# Patient Record
Sex: FEMALE | Race: ASIAN | NOT HISPANIC OR LATINO | URBAN - METROPOLITAN AREA
[De-identification: names, ages, dates, MRNs, and addresses within clinical notes are randomized per-mention and may not be internally consistent; named-entity substitution may affect disease eponyms.]

---

## 2017-01-02 ENCOUNTER — INPATIENT (INPATIENT)
Facility: HOSPITAL | Age: 72
LOS: 2 days | Discharge: HOME | DRG: 641 | End: 2017-01-05
Attending: INTERNAL MEDICINE | Admitting: INTERNAL MEDICINE
Payer: MEDICARE

## 2017-01-02 VITALS
HEIGHT: 60 IN | RESPIRATION RATE: 18 BRPM | WEIGHT: 169.98 LBS | DIASTOLIC BLOOD PRESSURE: 85 MMHG | SYSTOLIC BLOOD PRESSURE: 173 MMHG | TEMPERATURE: 98 F | OXYGEN SATURATION: 100 % | HEART RATE: 90 BPM

## 2017-01-02 DIAGNOSIS — E87.1 HYPO-OSMOLALITY AND HYPONATREMIA: ICD-10-CM

## 2017-01-02 DIAGNOSIS — E11.49 TYPE 2 DIABETES MELLITUS WITH OTHER DIABETIC NEUROLOGICAL COMPLICATION: ICD-10-CM

## 2017-01-02 DIAGNOSIS — G45.9 TRANSIENT CEREBRAL ISCHEMIC ATTACK, UNSPECIFIED: ICD-10-CM

## 2017-01-02 DIAGNOSIS — I10 ESSENTIAL (PRIMARY) HYPERTENSION: ICD-10-CM

## 2017-01-02 DIAGNOSIS — Z90.89 ACQUIRED ABSENCE OF OTHER ORGANS: Chronic | ICD-10-CM

## 2017-01-02 DIAGNOSIS — E78.4 OTHER HYPERLIPIDEMIA: ICD-10-CM

## 2017-01-02 DIAGNOSIS — L98.499 NON-PRESSURE CHRONIC ULCER OF SKIN OF OTHER SITES WITH UNSPECIFIED SEVERITY: ICD-10-CM

## 2017-01-02 DIAGNOSIS — Z90.710 ACQUIRED ABSENCE OF BOTH CERVIX AND UTERUS: Chronic | ICD-10-CM

## 2017-01-02 LAB
ALBUMIN SERPL ELPH-MCNC: 4.4 G/DL — SIGNIFICANT CHANGE UP (ref 3.3–5.2)
ALBUMIN SERPL ELPH-MCNC: 4.4 G/DL — SIGNIFICANT CHANGE UP (ref 3.3–5.2)
ALP SERPL-CCNC: 70 U/L — SIGNIFICANT CHANGE UP (ref 40–120)
ALP SERPL-CCNC: 75 U/L — SIGNIFICANT CHANGE UP (ref 40–120)
ALT FLD-CCNC: 10 U/L — SIGNIFICANT CHANGE UP
ALT FLD-CCNC: 11 U/L — SIGNIFICANT CHANGE UP
ANION GAP SERPL CALC-SCNC: 14 MMOL/L — SIGNIFICANT CHANGE UP (ref 5–17)
ANION GAP SERPL CALC-SCNC: 15 MMOL/L — SIGNIFICANT CHANGE UP (ref 5–17)
APPEARANCE UR: CLEAR — SIGNIFICANT CHANGE UP
AST SERPL-CCNC: 14 U/L — SIGNIFICANT CHANGE UP
AST SERPL-CCNC: 15 U/L — SIGNIFICANT CHANGE UP
BASOPHILS # BLD AUTO: 0 K/UL — SIGNIFICANT CHANGE UP (ref 0–0.2)
BASOPHILS NFR BLD AUTO: 0.8 % — SIGNIFICANT CHANGE UP (ref 0–2)
BILIRUB SERPL-MCNC: 0.3 MG/DL — LOW (ref 0.4–2)
BILIRUB SERPL-MCNC: 0.3 MG/DL — LOW (ref 0.4–2)
BILIRUB UR-MCNC: NEGATIVE — SIGNIFICANT CHANGE UP
BUN SERPL-MCNC: 10 MG/DL — SIGNIFICANT CHANGE UP (ref 8–20)
BUN SERPL-MCNC: 9 MG/DL — SIGNIFICANT CHANGE UP (ref 8–20)
CALCIUM SERPL-MCNC: 9.2 MG/DL — SIGNIFICANT CHANGE UP (ref 8.6–10.2)
CALCIUM SERPL-MCNC: 9.5 MG/DL — SIGNIFICANT CHANGE UP (ref 8.6–10.2)
CHLORIDE SERPL-SCNC: 79 MMOL/L — LOW (ref 98–107)
CHLORIDE SERPL-SCNC: 84 MMOL/L — LOW (ref 98–107)
CO2 SERPL-SCNC: 21 MMOL/L — LOW (ref 22–29)
CO2 SERPL-SCNC: 22 MMOL/L — SIGNIFICANT CHANGE UP (ref 22–29)
COLOR SPEC: SIGNIFICANT CHANGE UP
CREAT SERPL-MCNC: 0.56 MG/DL — SIGNIFICANT CHANGE UP (ref 0.5–1.3)
CREAT SERPL-MCNC: 0.67 MG/DL — SIGNIFICANT CHANGE UP (ref 0.5–1.3)
DIFF PNL FLD: NEGATIVE — SIGNIFICANT CHANGE UP
EOSINOPHIL # BLD AUTO: 0.1 K/UL — SIGNIFICANT CHANGE UP (ref 0–0.5)
EOSINOPHIL NFR BLD AUTO: 1.2 % — SIGNIFICANT CHANGE UP (ref 0–6)
GLUCOSE SERPL-MCNC: 104 MG/DL — SIGNIFICANT CHANGE UP (ref 70–115)
GLUCOSE SERPL-MCNC: 119 MG/DL — HIGH (ref 70–115)
GLUCOSE UR QL: NEGATIVE MG/DL — SIGNIFICANT CHANGE UP
HCT VFR BLD CALC: 32.2 % — LOW (ref 37–47)
HGB BLD-MCNC: 11.7 G/DL — LOW (ref 12–16)
KETONES UR-MCNC: ABNORMAL
LEUKOCYTE ESTERASE UR-ACNC: NEGATIVE — SIGNIFICANT CHANGE UP
LYMPHOCYTES # BLD AUTO: 1.5 K/UL — SIGNIFICANT CHANGE UP (ref 1–4.8)
LYMPHOCYTES # BLD AUTO: 30.3 % — SIGNIFICANT CHANGE UP (ref 20–55)
MCHC RBC-ENTMCNC: 31.2 PG — HIGH (ref 27–31)
MCHC RBC-ENTMCNC: 36.3 G/DL — HIGH (ref 32–36)
MCV RBC AUTO: 85.9 FL — SIGNIFICANT CHANGE UP (ref 81–99)
MONOCYTES # BLD AUTO: 0.4 K/UL — SIGNIFICANT CHANGE UP (ref 0–0.8)
MONOCYTES NFR BLD AUTO: 9.3 % — SIGNIFICANT CHANGE UP (ref 3–10)
NEUTROPHILS # BLD AUTO: 2.8 K/UL — SIGNIFICANT CHANGE UP (ref 1.8–8)
NEUTROPHILS NFR BLD AUTO: 58.4 % — SIGNIFICANT CHANGE UP (ref 37–73)
NITRITE UR-MCNC: NEGATIVE — SIGNIFICANT CHANGE UP
PH UR: 6 — SIGNIFICANT CHANGE UP (ref 4.8–8)
PLATELET # BLD AUTO: 332 K/UL — SIGNIFICANT CHANGE UP (ref 150–400)
POTASSIUM SERPL-MCNC: 4 MMOL/L — SIGNIFICANT CHANGE UP (ref 3.5–5.3)
POTASSIUM SERPL-MCNC: 4.3 MMOL/L — SIGNIFICANT CHANGE UP (ref 3.5–5.3)
POTASSIUM SERPL-SCNC: 4 MMOL/L — SIGNIFICANT CHANGE UP (ref 3.5–5.3)
POTASSIUM SERPL-SCNC: 4.3 MMOL/L — SIGNIFICANT CHANGE UP (ref 3.5–5.3)
PROT SERPL-MCNC: 7.7 G/DL — SIGNIFICANT CHANGE UP (ref 6.6–8.7)
PROT SERPL-MCNC: 7.7 G/DL — SIGNIFICANT CHANGE UP (ref 6.6–8.7)
PROT UR-MCNC: 30 MG/DL
RBC # BLD: 3.75 M/UL — LOW (ref 4.4–5.2)
RBC # FLD: 11.8 % — SIGNIFICANT CHANGE UP (ref 11–15.6)
SODIUM SERPL-SCNC: 116 MMOL/L — CRITICAL LOW (ref 135–145)
SODIUM SERPL-SCNC: 119 MMOL/L — CRITICAL LOW (ref 135–145)
SP GR SPEC: 1.01 — SIGNIFICANT CHANGE UP (ref 1.01–1.02)
TSH SERPL-MCNC: 2.42 UIU/ML — SIGNIFICANT CHANGE UP (ref 0.49–4.67)
UROBILINOGEN FLD QL: NEGATIVE MG/DL — SIGNIFICANT CHANGE UP
WBC # BLD: 4.82 K/UL — SIGNIFICANT CHANGE UP (ref 4.8–10.8)
WBC # FLD AUTO: 4.82 K/UL — SIGNIFICANT CHANGE UP (ref 4.8–10.8)

## 2017-01-02 PROCEDURE — 93010 ELECTROCARDIOGRAM REPORT: CPT

## 2017-01-02 PROCEDURE — 99223 1ST HOSP IP/OBS HIGH 75: CPT | Mod: AI

## 2017-01-02 PROCEDURE — 70450 CT HEAD/BRAIN W/O DYE: CPT | Mod: 26

## 2017-01-02 PROCEDURE — 99285 EMERGENCY DEPT VISIT HI MDM: CPT

## 2017-01-02 RX ORDER — ENOXAPARIN SODIUM 100 MG/ML
40 INJECTION SUBCUTANEOUS ONCE
Qty: 0 | Refills: 0 | Status: COMPLETED | OUTPATIENT
Start: 2017-01-02 | End: 2017-01-03

## 2017-01-02 RX ORDER — SODIUM CHLORIDE 9 MG/ML
1000 INJECTION INTRAMUSCULAR; INTRAVENOUS; SUBCUTANEOUS
Qty: 0 | Refills: 0 | Status: DISCONTINUED | OUTPATIENT
Start: 2017-01-02 | End: 2017-01-03

## 2017-01-02 RX ORDER — ACETAMINOPHEN 500 MG
650 TABLET ORAL EVERY 6 HOURS
Qty: 0 | Refills: 0 | Status: DISCONTINUED | OUTPATIENT
Start: 2017-01-02 | End: 2017-01-05

## 2017-01-02 RX ORDER — PANTOPRAZOLE SODIUM 20 MG/1
0 TABLET, DELAYED RELEASE ORAL
Qty: 0 | Refills: 0 | COMMUNITY

## 2017-01-02 RX ORDER — PANTOPRAZOLE SODIUM 20 MG/1
40 TABLET, DELAYED RELEASE ORAL
Qty: 0 | Refills: 0 | Status: DISCONTINUED | OUTPATIENT
Start: 2017-01-02 | End: 2017-01-05

## 2017-01-02 RX ORDER — ASCORBIC ACID 60 MG
0 TABLET,CHEWABLE ORAL
Qty: 0 | Refills: 0 | COMMUNITY

## 2017-01-02 RX ORDER — HYDRALAZINE HCL 50 MG
15 TABLET ORAL EVERY 4 HOURS
Qty: 0 | Refills: 0 | Status: DISCONTINUED | OUTPATIENT
Start: 2017-01-02 | End: 2017-01-05

## 2017-01-02 RX ORDER — ASPIRIN/CALCIUM CARB/MAGNESIUM 324 MG
81 TABLET ORAL DAILY
Qty: 0 | Refills: 0 | Status: DISCONTINUED | OUTPATIENT
Start: 2017-01-02 | End: 2017-01-05

## 2017-01-02 RX ORDER — OMEGA-3 ACID ETHYL ESTERS 1 G
0 CAPSULE ORAL
Qty: 0 | Refills: 0 | COMMUNITY

## 2017-01-02 RX ORDER — DEXTROSE 50 % IN WATER 50 %
1 SYRINGE (ML) INTRAVENOUS ONCE
Qty: 0 | Refills: 0 | Status: DISCONTINUED | OUTPATIENT
Start: 2017-01-02 | End: 2017-01-05

## 2017-01-02 RX ORDER — SODIUM CHLORIDE 9 MG/ML
1000 INJECTION, SOLUTION INTRAVENOUS
Qty: 0 | Refills: 0 | Status: DISCONTINUED | OUTPATIENT
Start: 2017-01-02 | End: 2017-01-05

## 2017-01-02 RX ORDER — FUROSEMIDE 40 MG
40 TABLET ORAL ONCE
Qty: 0 | Refills: 0 | Status: COMPLETED | OUTPATIENT
Start: 2017-01-02 | End: 2017-01-03

## 2017-01-02 RX ORDER — FERROUS SULFATE 325(65) MG
0 TABLET ORAL
Qty: 0 | Refills: 0 | COMMUNITY

## 2017-01-02 RX ORDER — GLUCAGON INJECTION, SOLUTION 0.5 MG/.1ML
1 INJECTION, SOLUTION SUBCUTANEOUS ONCE
Qty: 0 | Refills: 0 | Status: DISCONTINUED | OUTPATIENT
Start: 2017-01-02 | End: 2017-01-05

## 2017-01-02 RX ORDER — INSULIN LISPRO 100/ML
VIAL (ML) SUBCUTANEOUS
Qty: 0 | Refills: 0 | Status: DISCONTINUED | OUTPATIENT
Start: 2017-01-02 | End: 2017-01-05

## 2017-01-02 RX ORDER — SODIUM CHLORIDE 9 MG/ML
1000 INJECTION INTRAMUSCULAR; INTRAVENOUS; SUBCUTANEOUS ONCE
Qty: 0 | Refills: 0 | Status: COMPLETED | OUTPATIENT
Start: 2017-01-02 | End: 2017-01-02

## 2017-01-02 RX ORDER — DEXTROSE 50 % IN WATER 50 %
12.5 SYRINGE (ML) INTRAVENOUS ONCE
Qty: 0 | Refills: 0 | Status: DISCONTINUED | OUTPATIENT
Start: 2017-01-02 | End: 2017-01-05

## 2017-01-02 RX ORDER — DEXTROSE 50 % IN WATER 50 %
25 SYRINGE (ML) INTRAVENOUS ONCE
Qty: 0 | Refills: 0 | Status: DISCONTINUED | OUTPATIENT
Start: 2017-01-02 | End: 2017-01-05

## 2017-01-02 RX ORDER — NIFEDIPINE 30 MG
30 TABLET, EXTENDED RELEASE 24 HR ORAL DAILY
Qty: 0 | Refills: 0 | Status: DISCONTINUED | OUTPATIENT
Start: 2017-01-02 | End: 2017-01-05

## 2017-01-02 RX ORDER — INSULIN LISPRO 100/ML
VIAL (ML) SUBCUTANEOUS AT BEDTIME
Qty: 0 | Refills: 0 | Status: DISCONTINUED | OUTPATIENT
Start: 2017-01-02 | End: 2017-01-05

## 2017-01-02 RX ADMIN — SODIUM CHLORIDE 1000 MILLILITER(S): 9 INJECTION INTRAMUSCULAR; INTRAVENOUS; SUBCUTANEOUS at 17:05

## 2017-01-02 RX ADMIN — SODIUM CHLORIDE 1000 MILLILITER(S): 9 INJECTION INTRAMUSCULAR; INTRAVENOUS; SUBCUTANEOUS at 19:00

## 2017-01-02 RX ADMIN — SODIUM CHLORIDE 50 MILLILITER(S): 9 INJECTION INTRAMUSCULAR; INTRAVENOUS; SUBCUTANEOUS at 21:52

## 2017-01-02 NOTE — CONSULT NOTE ADULT - SUBJECTIVE AND OBJECTIVE BOX
72 y/o female 72 y/o female who while at the mall tonight became progressively week feeling like near syncope was brought to the ER found to be hyponatremic with initial sodium of 116. Consult called for hyponatremia      Vitals: Bp 168/72 Hr 84 RR 16 O2 sat 98%  Labs : Na 116 K 4.3 BUN 9.0 Cr 0.56 Gluc 104             Na 119 K 4.0 BUN 10 Cr 0.67 Cluc 119     ROS   Neuro:  negative, Denies HA, N/V, visual disturbances  EENT: Negative  RESP:  negative Denies Resp difficulty  Cardiac: Negative denies CP   Abd: Negative   : Negative      · Constitutional Comments	patient resting comfortly, good mood joking about waking her up with good affect	  · Eyes	EOMI; PERRL; no drainage or redness	  · ENMT	No oral lesions; no gross abnormalities	  · Neck	No bruits; no thyromegaly or nodules no JVD	  · Breasts	not examined	  		  · Respiratory	Breath Sounds equal & clear to percussion & auscultation, no accessory muscle use	  · Cardiovascular	Regular rate & rhythm, normal S1, S2; no murmurs, gallops or rubs; no S3, S4	  · Gastrointestinal  Neuro:  A&Ox3 MAEx4 good strenght, no    seizure  neurologically intact without deficits                                    	Soft, non-tender, no hepatosplenomegaly, normal bowel sounds

## 2017-01-02 NOTE — ED PROVIDER NOTE - OBJECTIVE STATEMENT
pt has been more confused past two days   today becamer acutely weak about 12- 1 opmm  unbale to walk while at mall pt has been more confused past two days   today became acutely weak about 12- 1 opmm  unable to walk while at mall

## 2017-01-02 NOTE — ED ADULT NURSE REASSESSMENT NOTE - NS ED NURSE REASSESS COMMENT FT1
Pt awake in bed, incontinent of urine x1. Perianal care performed, pt changed for comfort. Straight cath performed, approx 500cc urine drained, clear, straw yellow, no odor. UA sent to lab. Pt tolerated procedure well. NSR on cardiac monitor. VSS. Family @ bedside. Updated on POC, will continue to monitor and reassess.

## 2017-01-02 NOTE — H&P ADULT. - HISTORY OF PRESENT ILLNESS
71 year old female htn, hyperlipidemia, DM, history of electrolyte disturbance [place] ago. She was feeling poorly but decided to go to the mall today. In the mall she felt woozy and fell but her son caught her and brought her to the hospital where her sodium was found to be 116.  She was given normal saline now feels slightly better.

## 2017-01-02 NOTE — ED ADULT TRIAGE NOTE - CHIEF COMPLAINT QUOTE
patient arrived via wheelchair, awake, alert, confused, breathing unlabored.  As per son patient has been increasingly confused over past 2 days.  Son states patient became acutely weak today with unsteady gait, and increased confusion.  Dr. Cornell at bedside on arrival.  Blood sugar 141

## 2017-01-02 NOTE — ED ADULT NURSE NOTE - OBJECTIVE STATEMENT
Pt BIB family member, c/o AMS increased confusion over the past 2 days and increasing generalized weakness. Pt and family member are poor historians. Family member states pt baseline is unsteady balance and gait d/t weakness, but today it was worse than normal with pt "unable to hold herself up" @ the mall w/ son. Pt a&ox2, disoriented to time and situation, which family member states is pts baseline. Denies LOC, denies hitting head.  #20G IV noted to Right hand, dressing c/d/i. Resp even, unlabored. Moves all extremities. Pt in no apparent distress @ this time. NSR on cardiac monitor. Family @ bedside, updated on POC. Transport in ED to take pt to CT scan, will continue to monitor and reassess.

## 2017-01-02 NOTE — CONSULT NOTE ADULT - PROBLEM SELECTOR RECOMMENDATION 9
Normal volume hyponatremia  monitor for seizure activity   Normal saline at slow rate 50-75 cc/hr   renal consult

## 2017-01-02 NOTE — CONSULT NOTE ADULT - SUBJECTIVE AND OBJECTIVE BOX
Patient is a 71y old  Female who presents with a chief complaint of Altered mental status (2017 18:56)  We are asked to see pt due to low Na.    HPI:  71 year old female htn, hyperlipidemia, DM, history of electrolyte disturbance [place] ago. She was feeling poorly but decided to go to the mall today. In the mall she felt woozy and fell but her son caught her and brought her to the hospital where her sodium was found to be 116.  She was given normal saline now feels slightly better. (2017 18:56). HX of similar episode in [place] and was told to  drink  electrolyte  solution daily but has not. Hx prior TIA as per son.      PAST MEDICAL & SURGICAL HISTORY:  Hemorrhoids  Ulcer  TIA (transient ischemic attack)  Other hyperlipidemia  Essential hypertension  DM (diabetes mellitus)  S/P tonsillectomy    FAMILY HISTORY:  No pertinent family history  [place]    Social History: Prior smoker, [place]    MEDICATIONS  (STANDING):  aspirin enteric coated 81milliGRAM(s) Oral daily  insulin lispro (HumaLOG) corrective regimen sliding scale  SubCutaneous three times a day before meals  insulin lispro (HumaLOG) corrective regimen sliding scale  SubCutaneous at bedtime  dextrose 5%. 1000milliLiter(s) IV Continuous <Continuous>  dextrose 50% Injectable 12.5Gram(s) IV Push once  dextrose 50% Injectable 25Gram(s) IV Push once  dextrose 50% Injectable 25Gram(s) IV Push once  pantoprazole    Tablet 40milliGRAM(s) Oral before breakfast  enoxaparin Injectable 40milliGRAM(s) SubCutaneous once  sodium chloride 0.9%. 1000milliLiter(s) IV Continuous <Continuous>  furosemide   Injectable 40milliGRAM(s) IV Push once  NIFEdipine XL 30milliGRAM(s) Oral daily    MEDICATIONS  (PRN):  acetaminophen   Tablet 650milliGRAM(s) Oral every 6 hours PRN mild pain  dextrose Gel 1Dose(s) Oral once PRN Blood Glucose LESS THAN 70 milliGRAM(s)/deciliter  glucagon  Injectable 1milliGRAM(s) IntraMuscular once PRN Glucose LESS THAN 70 milligrams/deciliter  hydrALAZINE Injectable 15milliGRAM(s) IV Push every 4 hours PRN for systolic bp > 160   Meds reviewed    Allergies    No Known Allergies    Intolerances       REVIEW OF SYSTEMS:    CONSTITUTIONAL:  feels weak  EYES: No eye pain, visual disturbances, or discharge  ENMT:  No difficulty hearing, tinnitus, vertigo; No sinus or throat pain  NECK: No pain or stiffness  BREASTS: No pain, masses, or nipple discharge  RESPIRATORY: not sob  CARDIOVASCULAR: No chest pain, palpitations,  GASTROINTESTINAL: No abdominal or epigastric pain. No nausea, vomiting, or hematemesis; No diarrhea or constipation.   GENITOURINARY: No dysuria, frequency, hematuria, or incontinence  NEUROLOGICAL: No headaches, memory loss, loss of strength, numbness, or tremors, prior TIA  SKIN: neg  LYMPH NODES: No enlarged glands  ENDOCRINE: No heat or cold intolerance; No hair loss  MUSCULOSKELETAL:  neg  PSYCHIATRIC: No depression, anxiety, mood swings, or difficulty sleeping  HEME/LYMPH: No easy bruising, or bleeding gums  ALLERY AND IMMUNOLOGIC: No hives or eczema      Vital Signs Last 24 Hrs  T(C): 36.6, Max: 36.6 ( @ 14:12)  T(F): 97.9, Max: 97.9 ( @ 14:12)  HR: 86 (86 - 90)  BP: 172/78 (172/78 - 188/92)  BP(mean): --  RR: 16 (16 - 24)  SpO2: 100% (100% - 100%)  Daily Height in cm: 152.4 (2017 14:12)    Daily     PHYSICAL EXAM:    GENERAL: appears chronically ill, oriented.  HEAD:  Atraumatic, Normocephalic  EYES: EOMI, PERRLA, conjunctiva and sclera clear  ENMT: No tonsillar erythema, exudates, or enlargement; Moist mucous membranes, Good dentition, No lesions  NECK: Supple, neck  veinsnot distended  NERVOUS SYSTEM:  Alert & Oriented X3, Good concentration; Motor Strength wnl upper and lower extremities;  CHEST/LUNG: Clear to percussion bilaterally; No rales, rhonchi, wheezing, or rubs  HEART: Regular rate and rhythm; No murmurs, rubs, or gallops  ABDOMEN: Soft, Nontender, Nondistended; Bowel sounds present,no edema  EXTREMITIES:no edema  LYMPH: No lymphadenopathy noted  SKIN: No rashes or lesions, pale      LABS:                        11.7   4.82  )-----------( 332      ( 2017 14:53 )             32.2     2017 20:30    119    |  84     |  9.0    ----------------------------<  104    4.0     |  21.0   |  0.56     Ca    9.2        2017 20:30    TPro  7.7    /  Alb  4.4    /  TBili  0.3    /  DBili  x      /  AST  14     /  ALT  11     /  AlkPhos  75     2017 20:30      Urinalysis Basic - ( 2017 17:09 )    Color: Pale Yellow / Appearance: Clear / S.015 / pH: x  Gluc: x / Ketone: Trace  / Bili: Negative / Urobili: Negative mg/dL   Blood: x / Protein: 30 mg/dL / Nitrite: Negative   Leuk Esterase: Negative / RBC: x / WBC x   Sq Epi: x / Non Sq Epi: Occasional / Bacteria: x              RADIOLOGY & ADDITIONAL TESTS:

## 2017-01-02 NOTE — CONSULT NOTE ADULT - ASSESSMENT
Symptomatic low  sodium; DM, Hypertension, ? SIADH  pt has been on lisinopril
70 y/o female evaluated for ICU admission at request of hospitalist for hyponatremia. Patient is fully alert and oriented to person place and time with now report of seizure activity with only complaint of general weakness no active vomiting previous nausea which has resolved  Patient at this time does not meet criteria for ICU admission due to hemodynamic stable,  no signs of neuro compromise from hyponatremia initial Na 116 and repeat Na is now 119.   Discussed with Dr Reilly ( hospitalist team) who agrees with current plan of admission to [facility] for monitoring, renal consult called per Dr Reilly and reconsult if condition changes

## 2017-01-02 NOTE — ED ADULT NURSE NOTE - PMH
DM (diabetes mellitus)    Essential hypertension    Hemorrhoids    Other hyperlipidemia    TIA (transient ischemic attack)    Ulcer

## 2017-01-03 LAB
ALBUMIN SERPL ELPH-MCNC: 4.2 G/DL — SIGNIFICANT CHANGE UP (ref 3.3–5.2)
ALP SERPL-CCNC: 64 U/L — SIGNIFICANT CHANGE UP (ref 40–120)
ALT FLD-CCNC: 10 U/L — SIGNIFICANT CHANGE UP
ANION GAP SERPL CALC-SCNC: 13 MMOL/L — SIGNIFICANT CHANGE UP (ref 5–17)
AST SERPL-CCNC: 12 U/L — SIGNIFICANT CHANGE UP
BILIRUB SERPL-MCNC: 0.3 MG/DL — LOW (ref 0.4–2)
BUN SERPL-MCNC: 7 MG/DL — LOW (ref 8–20)
CALCIUM SERPL-MCNC: 9.3 MG/DL — SIGNIFICANT CHANGE UP (ref 8.6–10.2)
CHLORIDE SERPL-SCNC: 90 MMOL/L — LOW (ref 98–107)
CO2 SERPL-SCNC: 25 MMOL/L — SIGNIFICANT CHANGE UP (ref 22–29)
CORTIS AM PEAK SERPL-MCNC: 11.8 UG/DL — SIGNIFICANT CHANGE UP (ref 6.2–19.4)
CORTIS F PM SERPL-MCNC: 5.7 UG/DL — SIGNIFICANT CHANGE UP (ref 2.3–11.9)
CREAT SERPL-MCNC: 0.67 MG/DL — SIGNIFICANT CHANGE UP (ref 0.5–1.3)
GLUCOSE SERPL-MCNC: 110 MG/DL — SIGNIFICANT CHANGE UP (ref 70–115)
HBA1C BLD-MCNC: 5.9 % — HIGH (ref 4–5.6)
HCT VFR BLD CALC: 32.5 % — LOW (ref 37–47)
HGB BLD-MCNC: 11.6 G/DL — LOW (ref 12–16)
MAGNESIUM SERPL-MCNC: 1.9 MG/DL — SIGNIFICANT CHANGE UP (ref 1.8–2.5)
MCHC RBC-ENTMCNC: 30.8 PG — SIGNIFICANT CHANGE UP (ref 27–31)
MCHC RBC-ENTMCNC: 35.7 G/DL — SIGNIFICANT CHANGE UP (ref 32–36)
MCV RBC AUTO: 86.2 FL — SIGNIFICANT CHANGE UP (ref 81–99)
OSMOLALITY SERPL: 268 MOS/KG — LOW (ref 275–300)
OSMOLALITY UR: 278 MOSM/KG — LOW (ref 300–1000)
PHOSPHATE SERPL-MCNC: 3.8 MG/DL — SIGNIFICANT CHANGE UP (ref 2.4–4.7)
PLATELET # BLD AUTO: 403 K/UL — HIGH (ref 150–400)
POTASSIUM SERPL-MCNC: 4.3 MMOL/L — SIGNIFICANT CHANGE UP (ref 3.5–5.3)
POTASSIUM SERPL-SCNC: 4.3 MMOL/L — SIGNIFICANT CHANGE UP (ref 3.5–5.3)
POTASSIUM UR-SCNC: 19 MMOL/L — SIGNIFICANT CHANGE UP
PROT SERPL-MCNC: 7.4 G/DL — SIGNIFICANT CHANGE UP (ref 6.6–8.7)
RBC # BLD: 3.77 M/UL — LOW (ref 4.4–5.2)
RBC # FLD: 11.3 % — SIGNIFICANT CHANGE UP (ref 11–15.6)
SODIUM SERPL-SCNC: 128 MMOL/L — LOW (ref 135–145)
WBC # BLD: 5.35 K/UL — SIGNIFICANT CHANGE UP (ref 4.8–10.8)
WBC # FLD AUTO: 5.35 K/UL — SIGNIFICANT CHANGE UP (ref 4.8–10.8)

## 2017-01-03 PROCEDURE — 99233 SBSQ HOSP IP/OBS HIGH 50: CPT

## 2017-01-03 RX ORDER — ENOXAPARIN SODIUM 100 MG/ML
40 INJECTION SUBCUTANEOUS ONCE
Qty: 0 | Refills: 0 | Status: COMPLETED | OUTPATIENT
Start: 2017-01-03 | End: 2017-01-03

## 2017-01-03 RX ADMIN — PANTOPRAZOLE SODIUM 40 MILLIGRAM(S): 20 TABLET, DELAYED RELEASE ORAL at 06:43

## 2017-01-03 RX ADMIN — Medication 40 MILLIGRAM(S): at 06:42

## 2017-01-03 RX ADMIN — ENOXAPARIN SODIUM 40 MILLIGRAM(S): 100 INJECTION SUBCUTANEOUS at 06:43

## 2017-01-03 RX ADMIN — Medication 81 MILLIGRAM(S): at 11:47

## 2017-01-03 RX ADMIN — Medication 30 MILLIGRAM(S): at 06:43

## 2017-01-03 NOTE — PROGRESS NOTE ADULT - SUBJECTIVE AND OBJECTIVE BOX
NEPHROLOGY INTERVAL HPI/OVERNIGHT EVENTS:    Examined earlier No complaints   Denies AH CP No SOB speaks very little English  mostly [place]. I stopped IVF earlier when I saw Na.    MEDICATIONS  (STANDING):  aspirin enteric coated 81milliGRAM(s) Oral daily  insulin lispro (HumaLOG) corrective regimen sliding scale  SubCutaneous three times a day before meals  insulin lispro (HumaLOG) corrective regimen sliding scale  SubCutaneous at bedtime  dextrose 5%. 1000milliLiter(s) IV Continuous <Continuous>  dextrose 50% Injectable 12.5Gram(s) IV Push once  dextrose 50% Injectable 25Gram(s) IV Push once  dextrose 50% Injectable 25Gram(s) IV Push once  pantoprazole    Tablet 40milliGRAM(s) Oral before breakfast  NIFEdipine XL 30milliGRAM(s) Oral daily    MEDICATIONS  (PRN):  acetaminophen   Tablet 650milliGRAM(s) Oral every 6 hours PRN mild pain  dextrose Gel 1Dose(s) Oral once PRN Blood Glucose LESS THAN 70 milliGRAM(s)/deciliter  glucagon  Injectable 1milliGRAM(s) IntraMuscular once PRN Glucose LESS THAN 70 milligrams/deciliter  hydrALAZINE Injectable 15milliGRAM(s) IV Push every 4 hours PRN for systolic bp > 160      Allergies    No Known Allergies    Intolerances        Vital Signs Last 24 Hrs  T(C): 36.6, Max: 36.6 ( @ 14:12)  T(F): 97.9, Max: 97.9 ( @ 14:12)  HR: 84 (84 - 90)  BP: 142/77 (142/77 - 188/92)  BP(mean): --  RR: 16 (16 - 24)  SpO2: 100% (100% - 100%)  Daily Height in cm: 152.4 (2017 14:12)    Daily     PHYSICAL EXAM:    GENERAL: NAD, well-groomed  HEAD:  Atraumatic, Normocephalic  EYES: EOMI  NECK: Supple  NERVOUS SYSTEM:  Alert & Oriented X3  CHEST/LUNG: Clear to percussion bilaterally  HEART: Regular rate and rhythm; No murmurs  ABDOMEN: Soft, Nontender, Nondistended  EXTREMITIES:  No edema    LABS:                        11.6   5.35  )-----------( 403      ( 2017 06:22 )             32.5     2017 06:20    128    |  90     |  7.0    ----------------------------<  110    4.3     |  25.0   |  0.67     Ca    9.3        2017 06:20  Phos  3.8       2017 06:20  Mg     1.9       2017 06:20    TPro  7.4    /  Alb  4.2    /  TBili  0.3    /  DBili  x      /  AST  12     /  ALT  10     /  AlkPhos  64     2017 06:20      Urinalysis Basic - ( 2017 17:09 )    Color: Pale Yellow / Appearance: Clear / S.015 / pH: x  Gluc: x / Ketone: Trace  / Bili: Negative / Urobili: Negative mg/dL   Blood: x / Protein: 30 mg/dL / Nitrite: Negative   Leuk Esterase: Negative / RBC: x / WBC x   Sq Epi: x / Non Sq Epi: Occasional / Bacteria: x      Magnesium, Serum: 1.9 mg/dL ( @ 06:20)  Phosphorus Level, Serum: 3.8 mg/dL ( @ 06:20)          RADIOLOGY & ADDITIONAL TESTS:

## 2017-01-03 NOTE — PROGRESS NOTE ADULT - SUBJECTIVE AND OBJECTIVE BOX
JONO LI Patient is a 71y old  Female who presents with a chief complaint of Altered mental status (2017 18:56)     HPI:  71 year old female htn, hyperlipidemia, DM, history of electrolyte disturbance [place]. She was feeling poorly but decided to go to the mall today. In the mall she felt woozy and fell but her son caught her and brought her to the hospital where her sodium was found to be 116.  She was given normal saline now feels slightly better. (2017 18:56)    The patient was seen and evaluated   The patient is in no acute distress, states she feels better than she did on admission. She is making dilute urine.  Denied any fever chest pain, palpitations, shortness of breath, abdominal pain, fever, dysuria, cough, edema       Height (cm): 152.4 ( @ 14:12)  Weight (kg): 77.1 ( @ 14:12)  BMI (kg/m2): 33.2 ( @ 14:12)  BSA (m2): 1.74 ( @ :12)I&O's Summary    Allergies    No Known Allergies    Intolerances      HEALTH ISSUES - PROBLEM Dx:  Essential hypertension: Essential hypertension  Other hyperlipidemia: Other hyperlipidemia  Type 2 diabetes mellitus with other neurologic complication: Type 2 diabetes mellitus with other neurologic complication  Transient cerebral ischemia, unspecified type: Transient cerebral ischemia, unspecified type  Ulcer: Ulcer  Hyponatremia: Hyponatremia        PAST MEDICAL & SURGICAL HISTORY:  Hemorrhoids  Ulcer  TIA (transient ischemic attack)  Other hyperlipidemia  Essential hypertension  DM (diabetes mellitus)  S/P tonsillectomy  H/O abdominal hysterectomy  No significant past surgical history          Vital Signs Last 24 Hrs  T(C): 36.6, Max: 36.6 ( @ 14:12)  T(F): 97.9, Max: 97.9 ( @ 14:12)  HR: 84 (84 - 90)  BP: 142/77 (142/77 - 188/92)  RR: 16 (16 - 24)  SpO2: 100% (100% - 100%)T(C): 36.6, Max: 36.6 ( @ 14:12)      PHYSICAL EXAM:    GENERAL: NAD, well-groomed, well-developed  HEAD:  Atraumatic, Normocephalic  EYES: PERRLA, conjunctiva and sclera clear  ENMT:  Moist mucous membranes  NECK: Supple, No JVD, Normal thyroid  NERVOUS SYSTEM:  Alert & Oriented X3,  Moves upper and lower extremities.  CHEST/LUNG: Clear to auscultation bilaterally; No rales, rhonchi, wheezing,   HEART: Regular rate and rhythm; No murmurs,   EXTREMITIES:  Peripheral Pulses, No  cyanosis, or edema      acetaminophen   Tablet 650milliGRAM(s) Oral every 6 hours PRN  aspirin enteric coated 81milliGRAM(s) Oral daily  insulin lispro (HumaLOG) corrective regimen sliding scale  SubCutaneous three times a day before meals  insulin lispro (HumaLOG) corrective regimen sliding scale  SubCutaneous at bedtime  dextrose 5%. 1000milliLiter(s) IV Continuous <Continuous>  dextrose Gel 1Dose(s) Oral once PRN  dextrose 50% Injectable 12.5Gram(s) IV Push once  dextrose 50% Injectable 25Gram(s) IV Push once  dextrose 50% Injectable 25Gram(s) IV Push once  glucagon  Injectable 1milliGRAM(s) IntraMuscular once PRN  pantoprazole    Tablet 40milliGRAM(s) Oral before breakfast  sodium chloride 0.9%. 1000milliLiter(s) IV Continuous <Continuous>  hydrALAZINE Injectable 15milliGRAM(s) IV Push every 4 hours PRN  NIFEdipine XL 30milliGRAM(s) Oral daily      LABS:                          11.6   5.35  )-----------( 403      ( 2017 06:22 )             32.5     2017 06:20    128    |  90     |  7.0    ----------------------------<  110    4.3     |  25.0   |  0.67     Ca    9.3        2017 06:20  Phos  3.8       2017 06:20  Mg     1.9       2017 06:20    TPro  7.4    /  Alb  4.2    /  TBili  0.3    /  DBili  x      /  AST  12     /  ALT  10     /  AlkPhos  64     2017 06:20    LIVER FUNCTIONS - ( 2017 06:20 )  Alb: 4.2 g/dL / Pro: 7.4 g/dL / ALK PHOS: 64 U/L / ALT: 10 U/L / AST: 12 U/L / GGT: x                 Urinalysis Basic - ( 2017 17:09 )    Color: Pale Yellow / Appearance: Clear / S.015 / pH: x  Gluc: x / Ketone: Trace  / Bili: Negative / Urobili: Negative mg/dL   Blood: x / Protein: 30 mg/dL / Nitrite: Negative   Leuk Esterase: Negative / RBC: x / WBC x   Sq Epi: x / Non Sq Epi: Occasional / Bacteria: x      CAPILLARY BLOOD GLUCOSE  126 (2017 08:49)  99 (2017 21:47)      RADIOLOGY & ADDITIONAL TESTS:      Consultant notes reviewed    Case discussed with consultant/provider/ family /patient JONO LI Patient is a 71y old  Female who presents with a chief complaint of Altered mental status (2017 18:56)     HPI:  71 year old female htn, hyperlipidemia, DM, history of electrolyte disturbance [place]. She was feeling poorly but decided to go to the mall today. In the mall she felt woozy and fell but her son caught her and brought her to the hospital where her sodium was found to be 116.  She was given normal saline now feels slightly better. (2017 18:56)    The patient was seen and evaluated   The patient is in no acute distress, states she feels better than she did on admission. She is making dilute urine.  Denied any fever chest pain, palpitations, shortness of breath, abdominal pain, fever, dysuria, cough, edema       Height (cm): 152.4 ( @ 14:12)  Weight (kg): 77.1 ( @ 14:12)  BMI (kg/m2): 33.2 ( @ 14:12)  BSA (m2): 1.74 ( @ :12)I&O's Summary    Allergies    No Known Allergies    Intolerances      HEALTH ISSUES - PROBLEM Dx:  Essential hypertension: Essential hypertension  Other hyperlipidemia: Other hyperlipidemia  Type 2 diabetes mellitus with other neurologic complication: Type 2 diabetes mellitus with other neurologic complication  Transient cerebral ischemia, unspecified type: Transient cerebral ischemia, unspecified type  Ulcer: Ulcer  Hyponatremia: Hyponatremia        PAST MEDICAL & SURGICAL HISTORY:  Hemorrhoids  Ulcer  TIA (transient ischemic attack)  Other hyperlipidemia  Essential hypertension  DM (diabetes mellitus)  S/P tonsillectomy  H/O abdominal hysterectomy  No significant past surgical history    Vital Signs Last 24 Hrs  T(C): 36.6, Max: 36.6 ( @ 14:12)  T(F): 97.9, Max: 97.9 ( @ 14:12)  HR: 84 (84 - 90)  BP: 142/77 (142/77 - 188/92)  RR: 16 (16 - 24)  SpO2: 100% (100% - 100%)T(C): 36.6, Max: 36.6 ( @ 14:12)    PHYSICAL EXAM:    GENERAL: NAD, well-groomed, well-developed  HEAD:  Atraumatic, Normocephalic  EYES: PERRLA, conjunctiva and sclera clear  ENMT:  Moist mucous membranes  NECK: Supple, No JVD, Normal thyroid  NERVOUS SYSTEM:  Alert & Oriented X3,  Moves upper and lower extremities.  CHEST/LUNG: Clear to auscultation bilaterally; No rales, rhonchi, wheezing,   HEART: Regular rate and rhythm; No murmurs,   EXTREMITIES:  Peripheral Pulses, No  cyanosis, or edema      acetaminophen   Tablet 650milliGRAM(s) Oral every 6 hours PRN  aspirin enteric coated 81milliGRAM(s) Oral daily  insulin lispro (HumaLOG) corrective regimen sliding scale  SubCutaneous three times a day before meals  insulin lispro (HumaLOG) corrective regimen sliding scale  SubCutaneous at bedtime  dextrose 5%. 1000milliLiter(s) IV Continuous <Continuous>  dextrose Gel 1Dose(s) Oral once PRN  dextrose 50% Injectable 12.5Gram(s) IV Push once  dextrose 50% Injectable 25Gram(s) IV Push once  dextrose 50% Injectable 25Gram(s) IV Push once  glucagon  Injectable 1milliGRAM(s) IntraMuscular once PRN  pantoprazole    Tablet 40milliGRAM(s) Oral before breakfast  sodium chloride 0.9%. 1000milliLiter(s) IV Continuous <Continuous>  hydrALAZINE Injectable 15milliGRAM(s) IV Push every 4 hours PRN  NIFEdipine XL 30milliGRAM(s) Oral daily      LABS:                          11.6   5.35  )-----------( 403      ( 2017 06:22 )             32.5     2017 06:20    128    |  90     |  7.0    ----------------------------<  110    4.3     |  25.0   |  0.67     Ca    9.3        2017 06:20  Phos  3.8       2017 06:20  Mg     1.9       2017 06:20    TPro  7.4    /  Alb  4.2    /  TBili  0.3    /  DBili  x      /  AST  12     /  ALT  10     /  AlkPhos  64     2017 06:20    LIVER FUNCTIONS - ( 2017 06:20 )  Alb: 4.2 g/dL / Pro: 7.4 g/dL / ALK PHOS: 64 U/L / ALT: 10 U/L / AST: 12 U/L / GGT: x                 Urinalysis Basic - ( 2017 17:09 )    Color: Pale Yellow / Appearance: Clear / S.015 / pH: x  Gluc: x / Ketone: Trace  / Bili: Negative / Urobili: Negative mg/dL   Blood: x / Protein: 30 mg/dL / Nitrite: Negative   Leuk Esterase: Negative / RBC: x / WBC x   Sq Epi: x / Non Sq Epi: Occasional / Bacteria: x      CAPILLARY BLOOD GLUCOSE  126 (2017 08:49)  99 (2017 21:47)      RADIOLOGY & ADDITIONAL TESTS:      Consultant notes reviewed    Case discussed with consultant/provider/ family /patient

## 2017-01-03 NOTE — PROGRESS NOTE ADULT - ATTENDING COMMENTS
71 year old female likely siadh  I spoke to lab to confirm urine osm and urine lytes, await cortisol  dc in am if stable

## 2017-01-04 LAB
ANION GAP SERPL CALC-SCNC: 18 MMOL/L — HIGH (ref 5–17)
APPEARANCE UR: CLEAR — SIGNIFICANT CHANGE UP
BACTERIA # UR AUTO: ABNORMAL
BILIRUB UR-MCNC: NEGATIVE — SIGNIFICANT CHANGE UP
BUN SERPL-MCNC: 16 MG/DL — SIGNIFICANT CHANGE UP (ref 8–20)
CALCIUM SERPL-MCNC: 10 MG/DL — SIGNIFICANT CHANGE UP (ref 8.6–10.2)
CHLORIDE SERPL-SCNC: 86 MMOL/L — LOW (ref 98–107)
CHLORIDE UR-SCNC: 23 MMOL/L — SIGNIFICANT CHANGE UP
CO2 SERPL-SCNC: 23 MMOL/L — SIGNIFICANT CHANGE UP (ref 22–29)
COLOR SPEC: YELLOW — SIGNIFICANT CHANGE UP
CORTIS AM PEAK SERPL-MCNC: 16.4 UG/DL — SIGNIFICANT CHANGE UP (ref 6.2–19.4)
CORTIS SP2 SERPL-MCNC: 46.4 UG/DL — SIGNIFICANT CHANGE UP
CREAT SERPL-MCNC: 0.86 MG/DL — SIGNIFICANT CHANGE UP (ref 0.5–1.3)
DIFF PNL FLD: ABNORMAL
EPI CELLS # UR: ABNORMAL
GLUCOSE SERPL-MCNC: 153 MG/DL — HIGH (ref 70–115)
GLUCOSE UR QL: 100 MG/DL
KETONES UR-MCNC: ABNORMAL
LEUKOCYTE ESTERASE UR-ACNC: ABNORMAL
NITRITE UR-MCNC: NEGATIVE — SIGNIFICANT CHANGE UP
PH UR: 5 — SIGNIFICANT CHANGE UP (ref 4.8–8)
POTASSIUM SERPL-MCNC: 3.7 MMOL/L — SIGNIFICANT CHANGE UP (ref 3.5–5.3)
POTASSIUM SERPL-SCNC: 3.7 MMOL/L — SIGNIFICANT CHANGE UP (ref 3.5–5.3)
PROT UR-MCNC: 30 MG/DL
RBC CASTS # UR COMP ASSIST: ABNORMAL /HPF (ref 0–4)
SODIUM SERPL-SCNC: 127 MMOL/L — LOW (ref 135–145)
SODIUM UR-SCNC: 27 MMOL/L — SIGNIFICANT CHANGE UP
SP GR SPEC: 1.01 — SIGNIFICANT CHANGE UP (ref 1.01–1.02)
UROBILINOGEN FLD QL: NEGATIVE — SIGNIFICANT CHANGE UP
WBC UR QL: ABNORMAL

## 2017-01-04 PROCEDURE — 99233 SBSQ HOSP IP/OBS HIGH 50: CPT

## 2017-01-04 RX ORDER — COSYNTROPIN 0.25 MG/ML
0.25 INJECTION, SOLUTION INTRAVENOUS ONCE
Qty: 0 | Refills: 0 | Status: COMPLETED | OUTPATIENT
Start: 2017-01-04 | End: 2017-01-04

## 2017-01-04 RX ORDER — ENOXAPARIN SODIUM 100 MG/ML
40 INJECTION SUBCUTANEOUS DAILY
Qty: 0 | Refills: 0 | Status: DISCONTINUED | OUTPATIENT
Start: 2017-01-04 | End: 2017-01-05

## 2017-01-04 RX ADMIN — Medication 4: at 12:06

## 2017-01-04 RX ADMIN — ENOXAPARIN SODIUM 40 MILLIGRAM(S): 100 INJECTION SUBCUTANEOUS at 12:07

## 2017-01-04 RX ADMIN — COSYNTROPIN 0.25 MILLIGRAM(S): 0.25 INJECTION, SOLUTION INTRAVENOUS at 09:01

## 2017-01-04 RX ADMIN — Medication 81 MILLIGRAM(S): at 12:06

## 2017-01-04 RX ADMIN — Medication 2: at 16:38

## 2017-01-04 RX ADMIN — PANTOPRAZOLE SODIUM 40 MILLIGRAM(S): 20 TABLET, DELAYED RELEASE ORAL at 06:39

## 2017-01-04 RX ADMIN — Medication 2: at 08:09

## 2017-01-04 RX ADMIN — Medication 30 MILLIGRAM(S): at 05:23

## 2017-01-04 NOTE — PHYSICAL THERAPY INITIAL EVALUATION ADULT - GENERAL OBSERVATIONS, REHAB EVAL
pt received ambulating c CNA in room, no device, supervision level pt received ambulating c CNA in room, no device, supervision level, no c/o pain throughout session

## 2017-01-04 NOTE — PROGRESS NOTE ADULT - SUBJECTIVE AND OBJECTIVE BOX
JONO LI     Chief Complaint: Patient is a 71y old  Female who presents with a chief complaint of Altered mental status (2017 18:56)      PAST MEDICAL & SURGICAL HISTORY:  Hemorrhoids  Ulcer  TIA (transient ischemic attack)  Other hyperlipidemia  Essential hypertension  DM (diabetes mellitus)  S/P tonsillectomy  H/O abdominal hysterectomy  No significant past surgical history      HPI/OVERNIGHT EVENTS: Patient is doing well. Her cortisol is low. I ordered ACTH stimulation diamond to rule out adrenal insufficiency.    MEDICATIONS  (STANDING):  aspirin enteric coated 81milliGRAM(s) Oral daily  insulin lispro (HumaLOG) corrective regimen sliding scale  SubCutaneous three times a day before meals  insulin lispro (HumaLOG) corrective regimen sliding scale  SubCutaneous at bedtime  dextrose 5%. 1000milliLiter(s) IV Continuous <Continuous>  dextrose 50% Injectable 12.5Gram(s) IV Push once  dextrose 50% Injectable 25Gram(s) IV Push once  dextrose 50% Injectable 25Gram(s) IV Push once  pantoprazole    Tablet 40milliGRAM(s) Oral before breakfast  NIFEdipine XL 30milliGRAM(s) Oral daily  enoxaparin Injectable 40milliGRAM(s) SubCutaneous daily      Allergies: No Known Allergies      REVIEW OF SYSTEMS:    CONSTITUTIONAL: No fever  ENMT:  No difficulty hearing, tinnitus, vertigo; No sinus or throat pain  NECK: No pain or stiffness  RESPIRATORY: No cough, wheezing, chills or hemoptysis; No shortness of breath  CARDIOVASCULAR: No chest pain, palpitations, dizziness, or leg swelling  GASTROINTESTINAL: No abdominal or epigastric pain. No nausea, vomiting, or hematemesis; No diarrhea or constipation. No melena or hematochezia.  GENITOURINARY: No dysuria, frequency, hematuria, or incontinence  NEUROLOGICAL: No headaches, memory loss, loss of strength, numbness, or tremors  SKIN: No itching, burning, rashes, or lesions   MUSCULOSKELETAL: No joint pain or swelling; No muscle, back, or extremity pain  PSYCHIATRIC: No depression, anxiety, mood swings, or difficulty sleeping    Vital Signs Last 24 Hrs  T(C): 36.9, Max: 37.3 ( @ 20:18)  T(F): 98.4, Max: 99.1 ( @ 20:18)  HR: 110 (102 - 119)  BP: 135/88 (130/67 - 137/71)  BP(mean): --  RR: 18 (18 - 24)  SpO2: 99% (99% - 100%)    PHYSICAL EXAM:  Constitutional: NAD, well-groomed, well-developed  HEENT: PERRLA, EOMI, Normal Hearing, MMM  Neck: No LAD, No JVD  Back: Normal spine flexure, No CVA tenderness  Respiratory: CTAB Cardiovascular: S1 and S2, RRR, no M/G/R  Gastrointestinal: BS+, soft, NT/ND  Extremities: No peripheral edema  Vascular: 2+ peripheral pulses  Neurological: A/O x 3, no focal deficits  Psychiatric: Normal mood, normal affect  Musculoskeletal: 5/5 strength b/l upper and lower extremities  Skin: No rashes        CAPILLARY BLOOD GLUCOSE  179 (2017 08:09)  203 (2017 21:41)  134 (2017 17:10)  120 (2017 13:37)  126 (2017 08:49)  99 (2017 21:47)    LABS:                        11.6   5.35  )-----------( 403      ( 2017 06:22 )             32.5     2017 07:31    127    |  86     |  16.0   ----------------------------<  153    3.7     |  23.0   |  0.86     Ca    10.0       2017 07:31  Phos  3.8       2017 06:20  Mg     1.9       2017 06:20    TPro  7.4    /  Alb  4.2    /  TBili  0.3    /  DBili  x      /  AST  12     /  ALT  10     /  AlkPhos  64     2017 06:20      Urinalysis Basic - ( 2017 17:09 )    Color: Pale Yellow / Appearance: Clear / S.015 / pH: x  Gluc: x / Ketone: Trace  / Bili: Negative / Urobili: Negative mg/dL   Blood: x / Protein: 30 mg/dL / Nitrite: Negative   Leuk Esterase: Negative / RBC: x / WBC x   Sq Epi: x / Non Sq Epi: Occasional / Bacteria: x        RADIOLOGY & ADDITIONAL TESTS:

## 2017-01-04 NOTE — PROGRESS NOTE ADULT - PROBLEM SELECTOR PROBLEM 2
Type 2 diabetes mellitus with other neurologic complication
Type 2 diabetes mellitus with other neurologic complication

## 2017-01-04 NOTE — PHYSICAL THERAPY INITIAL EVALUATION ADULT - CRITERIA FOR SKILLED THERAPEUTIC INTERVENTIONS
functional limitations in following categories/anticipated discharge recommendation/predicted duration of therapy intervention/impairments found/therapy frequency/rehab potential

## 2017-01-04 NOTE — PROGRESS NOTE ADULT - SUBJECTIVE AND OBJECTIVE BOX
NEPHROLOGY INTERVAL HPI/OVERNIGHT EVENTS:    Examined earlier No complaints   Denies AH CP No SOB speaks very little English  mostly Green[place]ic.    MEDICATIONS  (STANDING):  aspirin enteric coated 81milliGRAM(s) Oral daily  insulin lispro (HumaLOG) corrective regimen sliding scale  SubCutaneous three times a day before meals  insulin lispro (HumaLOG) corrective regimen sliding scale  SubCutaneous at bedtime  dextrose 5%. 1000milliLiter(s) IV Continuous <Continuous>  dextrose 50% Injectable 12.5Gram(s) IV Push once  dextrose 50% Injectable 25Gram(s) IV Push once  dextrose 50% Injectable 25Gram(s) IV Push once  pantoprazole    Tablet 40milliGRAM(s) Oral before breakfast  NIFEdipine XL 30milliGRAM(s) Oral daily  enoxaparin Injectable 40milliGRAM(s) SubCutaneous daily    MEDICATIONS  (PRN):  acetaminophen   Tablet 650milliGRAM(s) Oral every 6 hours PRN mild pain  dextrose Gel 1Dose(s) Oral once PRN Blood Glucose LESS THAN 70 milliGRAM(s)/deciliter  glucagon  Injectable 1milliGRAM(s) IntraMuscular once PRN Glucose LESS THAN 70 milligrams/deciliter  hydrALAZINE Injectable 15milliGRAM(s) IV Push every 4 hours PRN for systolic bp > 160      Allergies    No Known Allergies    Intolerances        Vital Signs Last 24 Hrs  T(C): 36.8, Max: 37.3 ( @ 20:18)  T(F): 98.2, Max: 99.1 ( @ 20:18)  HR: 100 (100 - 110)  BP: 138/82 (130/82 - 138/82)  BP(mean): --  RR: 18 (18 - 22)  SpO2: 99% (99% - 100%)  Daily     Daily     PHYSICAL EXAM:  GENERAL: NAD, well-groomed  HEAD:  Atraumatic, Normocephalic  EYES: EOMI  NECK: Supple  NERVOUS SYSTEM:  Alert & Oriented X3  CHEST/LUNG: Clear to percussion bilaterally  HEART: Regular rate and rhythm; No murmurs  ABDOMEN: Soft, Nontender, Nondistended  EXTREMITIES:  No edema                              11.6   5.35  )-----------( 403      ( 2017 06:22 )             32.5     2017 07:31    127    |  86     |  16.0   ----------------------------<  153    3.7     |  23.0   |  0.86     Ca    10.0       2017 07:31  Phos  3.8       2017 06:20  Mg     1.9       2017 06:20    TPro  7.4    /  Alb  4.2    /  TBili  0.3    /  DBili  x      /  AST  12     /  ALT  10     /  AlkPhos  64     2017 06:20      Urinalysis Basic - ( 2017 13:32 )    Color: Yellow / Appearance: Clear / S.015 / pH: x  Gluc: x / Ketone: Trace  / Bili: Negative / Urobili: Negative   Blood: x / Protein: 30 mg/dL / Nitrite: Negative   Leuk Esterase: Moderate / RBC: 3-5 /HPF / WBC 11-25   Sq Epi: x / Non Sq Epi: Moderate / Bacteria: Few              RADIOLOGY & ADDITIONAL TESTS:

## 2017-01-04 NOTE — PROGRESS NOTE ADULT - PROBLEM SELECTOR PLAN 1
Urine is not concentrated, although it was drawn after lasix.  Possible adrenal insufficiency  ACTH stim test  urine lytes
-?SIADH  -Currently on NS @ 50ml/hr  -Sodium correcting appropriately, will monitor for neurologic symptoms of over correction  -Cortisol and urine osmolarity pending  -Free water restrict  -Appreciate nephrology consult

## 2017-01-04 NOTE — PHYSICAL THERAPY INITIAL EVALUATION ADULT - ADDITIONAL COMMENTS
as per pt and CCC: currently lives c son(works during the day), plans to stay c daughter who can assist throughout the day, owns no DME, states has "a few steps to bedroom" with a rail.

## 2017-01-05 ENCOUNTER — TRANSCRIPTION ENCOUNTER (OUTPATIENT)
Age: 72
End: 2017-01-05

## 2017-01-05 VITALS
TEMPERATURE: 98 F | DIASTOLIC BLOOD PRESSURE: 76 MMHG | OXYGEN SATURATION: 98 % | SYSTOLIC BLOOD PRESSURE: 128 MMHG | HEART RATE: 92 BPM | RESPIRATION RATE: 18 BRPM

## 2017-01-05 LAB
ANION GAP SERPL CALC-SCNC: 16 MMOL/L — SIGNIFICANT CHANGE UP (ref 5–17)
BUN SERPL-MCNC: 18 MG/DL — SIGNIFICANT CHANGE UP (ref 8–20)
CALCIUM SERPL-MCNC: 9.5 MG/DL — SIGNIFICANT CHANGE UP (ref 8.6–10.2)
CHLORIDE SERPL-SCNC: 89 MMOL/L — LOW (ref 98–107)
CO2 SERPL-SCNC: 23 MMOL/L — SIGNIFICANT CHANGE UP (ref 22–29)
CORTIS PRE/P CHAL SERPL-SCNC: SIGNIFICANT CHANGE UP
CREAT SERPL-MCNC: 0.95 MG/DL — SIGNIFICANT CHANGE UP (ref 0.5–1.3)
GLUCOSE SERPL-MCNC: 186 MG/DL — HIGH (ref 70–115)
POTASSIUM SERPL-MCNC: 3.7 MMOL/L — SIGNIFICANT CHANGE UP (ref 3.5–5.3)
POTASSIUM SERPL-SCNC: 3.7 MMOL/L — SIGNIFICANT CHANGE UP (ref 3.5–5.3)
SODIUM SERPL-SCNC: 128 MMOL/L — LOW (ref 135–145)

## 2017-01-05 PROCEDURE — 84133 ASSAY OF URINE POTASSIUM: CPT

## 2017-01-05 PROCEDURE — 70450 CT HEAD/BRAIN W/O DYE: CPT

## 2017-01-05 PROCEDURE — 36415 COLL VENOUS BLD VENIPUNCTURE: CPT

## 2017-01-05 PROCEDURE — 51701 INSERT BLADDER CATHETER: CPT

## 2017-01-05 PROCEDURE — 84100 ASSAY OF PHOSPHORUS: CPT

## 2017-01-05 PROCEDURE — 83930 ASSAY OF BLOOD OSMOLALITY: CPT

## 2017-01-05 PROCEDURE — 82436 ASSAY OF URINE CHLORIDE: CPT

## 2017-01-05 PROCEDURE — 82533 TOTAL CORTISOL: CPT

## 2017-01-05 PROCEDURE — 97163 PT EVAL HIGH COMPLEX 45 MIN: CPT

## 2017-01-05 PROCEDURE — 85027 COMPLETE CBC AUTOMATED: CPT

## 2017-01-05 PROCEDURE — 99285 EMERGENCY DEPT VISIT HI MDM: CPT | Mod: 25

## 2017-01-05 PROCEDURE — 93005 ELECTROCARDIOGRAM TRACING: CPT

## 2017-01-05 PROCEDURE — 84300 ASSAY OF URINE SODIUM: CPT

## 2017-01-05 PROCEDURE — 83935 ASSAY OF URINE OSMOLALITY: CPT

## 2017-01-05 PROCEDURE — 80053 COMPREHEN METABOLIC PANEL: CPT

## 2017-01-05 PROCEDURE — 83735 ASSAY OF MAGNESIUM: CPT

## 2017-01-05 PROCEDURE — 80048 BASIC METABOLIC PNL TOTAL CA: CPT

## 2017-01-05 PROCEDURE — 84443 ASSAY THYROID STIM HORMONE: CPT

## 2017-01-05 PROCEDURE — 71045 X-RAY EXAM CHEST 1 VIEW: CPT

## 2017-01-05 PROCEDURE — 83036 HEMOGLOBIN GLYCOSYLATED A1C: CPT

## 2017-01-05 PROCEDURE — 81001 URINALYSIS AUTO W/SCOPE: CPT

## 2017-01-05 PROCEDURE — 99239 HOSP IP/OBS DSCHRG MGMT >30: CPT

## 2017-01-05 RX ORDER — SITAGLIPTIN 50 MG/1
0 TABLET, FILM COATED ORAL
Qty: 0 | Refills: 0 | COMMUNITY

## 2017-01-05 RX ORDER — SODIUM CHLORIDE 9 MG/ML
1000 INJECTION INTRAMUSCULAR; INTRAVENOUS; SUBCUTANEOUS
Qty: 30 | Refills: 0 | OUTPATIENT
Start: 2017-01-05 | End: 2017-02-04

## 2017-01-05 RX ORDER — SITAGLIPTIN 50 MG/1
1 TABLET, FILM COATED ORAL
Qty: 30 | Refills: 0 | OUTPATIENT
Start: 2017-01-05 | End: 2017-02-04

## 2017-01-05 RX ORDER — METFORMIN HYDROCHLORIDE 850 MG/1
1 TABLET ORAL
Qty: 60 | Refills: 0 | OUTPATIENT
Start: 2017-01-05 | End: 2017-02-04

## 2017-01-05 RX ORDER — LISINOPRIL 2.5 MG/1
1 TABLET ORAL
Qty: 0 | Refills: 0 | COMMUNITY

## 2017-01-05 RX ORDER — METFORMIN HYDROCHLORIDE 850 MG/1
1 TABLET ORAL
Qty: 0 | Refills: 0 | COMMUNITY

## 2017-01-05 RX ORDER — ASPIRIN/CALCIUM CARB/MAGNESIUM 324 MG
81 TABLET ORAL
Qty: 0 | Refills: 0 | COMMUNITY

## 2017-01-05 RX ORDER — NIFEDIPINE 30 MG
1 TABLET, EXTENDED RELEASE 24 HR ORAL
Qty: 30 | Refills: 0 | OUTPATIENT
Start: 2017-01-05 | End: 2017-02-04

## 2017-01-05 RX ORDER — ASPIRIN/CALCIUM CARB/MAGNESIUM 324 MG
0 TABLET ORAL
Qty: 0 | Refills: 0 | COMMUNITY

## 2017-01-05 RX ADMIN — Medication 30 MILLIGRAM(S): at 05:40

## 2017-01-05 RX ADMIN — Medication 2: at 09:56

## 2017-01-05 RX ADMIN — Medication 81 MILLIGRAM(S): at 13:28

## 2017-01-05 RX ADMIN — PANTOPRAZOLE SODIUM 40 MILLIGRAM(S): 20 TABLET, DELAYED RELEASE ORAL at 06:03

## 2017-01-05 RX ADMIN — ENOXAPARIN SODIUM 40 MILLIGRAM(S): 100 INJECTION SUBCUTANEOUS at 13:28

## 2017-01-05 NOTE — DISCHARGE NOTE ADULT - CARE PLAN
Principal Discharge DX:	Hyponatremia  Goal:	Stable sodium level  Instructions for follow-up, activity and diet:	Stop lisinopril  Add nifedipine 30 mg indefinetely,   add sodium chloride 1 gm daily for one month then stop  Limit fluids to one 8 ounce serving three times a day with meals  Secondary Diagnosis:	Type 2 diabetes mellitus with other neurologic complication  Goal:	Tolerable glucose  Instructions for follow-up, activity and diet:	Januvia 100 mg daily  metformin 500 bid  Secondary Diagnosis:	Essential hypertension  Goal:	Stable blood pressure  Instructions for follow-up, activity and diet:	Monitor BP at home

## 2017-01-05 NOTE — DISCHARGE NOTE ADULT - MEDICATION SUMMARY - MEDICATIONS TO CHANGE
I will SWITCH the dose or number of times a day I take the medications listed below when I get home from the hospital:    Januvia 50 mg oral tablet  --  by mouth 2 times a day

## 2017-01-05 NOTE — DISCHARGE NOTE ADULT - PLAN OF CARE
Stable sodium level Stop lisinopril  Add nifedipine 30 mg indefinetely,   add sodium chloride 1 gm daily for one month then stop  Limit fluids to one 8 ounce serving three times a day with meals Tolerable glucose Januvia 100 mg daily  metformin 500 bid Stable blood pressure Monitor BP at home

## 2017-01-05 NOTE — PROGRESS NOTE ADULT - ASSESSMENT
71 year old female with htn, hyperlipidemia, DM, history of electrolyte disturbance presented with altered mental status in the presence of a low sodium level down to 116 on admission. Has since increase to 128 with IVF.
71 year old female with htn, hyperlipidemia, DM, history of electrolyte disturbance presented with altered mental status in the presence of a low sodium level down to 116 on admission. Has since increase to 128 with IVF.
HypoNatremia suspect 2/2 hypoVolemia improved with IVF now no longer needed. Na improved  Urine Na high side but was given lasix. cont fluid restict 1.2 L /24hrs  repeat labs.
HypoNatremia suspect 2/2 hypoVolemic improved with IVF overnight which are no longer needed  will check urine Na and fluid restict 1.2 L /24hrs  repeat labs
Hyponatremia: + hypovolemic component r/o SIADH as well                        Urine Na and Osm noted but obtained post diuretics                        - oral fluid restrict                        - add NaCl tabs                        - monitor labs  Probable d/c home today

## 2017-01-05 NOTE — PROGRESS NOTE ADULT - SUBJECTIVE AND OBJECTIVE BOX
NEPHROLOGY INTERVAL HPI/OVERNIGHT EVENTS:  pt clinically stable  no cp, sob, n/v/d    MEDICATIONS  (STANDING):  aspirin enteric coated 81milliGRAM(s) Oral daily  insulin lispro (HumaLOG) corrective regimen sliding scale  SubCutaneous three times a day before meals  insulin lispro (HumaLOG) corrective regimen sliding scale  SubCutaneous at bedtime  dextrose 5%. 1000milliLiter(s) IV Continuous <Continuous>  dextrose 50% Injectable 12.5Gram(s) IV Push once  dextrose 50% Injectable 25Gram(s) IV Push once  dextrose 50% Injectable 25Gram(s) IV Push once  pantoprazole    Tablet 40milliGRAM(s) Oral before breakfast  NIFEdipine XL 30milliGRAM(s) Oral daily  enoxaparin Injectable 40milliGRAM(s) SubCutaneous daily    MEDICATIONS  (PRN):  acetaminophen   Tablet 650milliGRAM(s) Oral every 6 hours PRN mild pain  dextrose Gel 1Dose(s) Oral once PRN Blood Glucose LESS THAN 70 milliGRAM(s)/deciliter  glucagon  Injectable 1milliGRAM(s) IntraMuscular once PRN Glucose LESS THAN 70 milligrams/deciliter  hydrALAZINE Injectable 15milliGRAM(s) IV Push every 4 hours PRN for systolic bp > 160      Allergies    No Known Allergies    Intolerances    Vital Signs Last 24 Hrs  T(C): 36.8, Max: 36.8 ( @ 16:17)  T(F): 98.2, Max: 98.2 ( @ 16:17)  HR: 92 (89 - 100)  BP: 128/76 (128/76 - 141/85)  BP(mean): --  RR: 18 (17 - 18)  SpO2: 98% (98% - 99%)    PHYSICAL EXAM:    GENERAL: NAD  HEAD:  Atraumatic, Normocephalic  EYES: EOMI, PERRLA, conjunctiva and sclera clear  NECK: Supple, No JVD, Normal thyroid  NERVOUS SYSTEM:  Alert & Oriented X3  CHEST/LUNG: Clear to percussion bilaterally; No rales, rhonchi, wheezing, or rubs  HEART: Regular rate and rhythm; No murmurs, rubs, or gallops  ABDOMEN: Soft, Nontender, Nondistended; Bowel sounds present  EXTREMITIES:  2+ Peripheral Pulses, No clubbing, cyanosis, or edema  LYMPH: No lymphadenopathy noted  SKIN: No rashes or lesions    LABS:    2017 07:20    128    |  89     |  18.0   ----------------------------<  186    3.7     |  23.0   |  0.95     Ca    9.5        2017 07:20        Urinalysis Basic - ( 2017 13:32 )    Color: Yellow / Appearance: Clear / S.015 / pH: x  Gluc: x / Ketone: Trace  / Bili: Negative / Urobili: Negative   Blood: x / Protein: 30 mg/dL / Nitrite: Negative   Leuk Esterase: Moderate / RBC: 3-5 /HPF / WBC 11-25   Sq Epi: x / Non Sq Epi: Moderate / Bacteria: Few    Sodium, Random Urine: 27: Reference Ranges have NOT been established for random urine analytes due  to variability in fluid intake and concentration. mmol/L (17 @ 13:31)    Osmolality, Random Urine: 278 mosm/kg (17 @ 10:24)    RADIOLOGY & ADDITIONAL TESTS:

## 2017-01-05 NOTE — DISCHARGE NOTE ADULT - PATIENT PORTAL LINK FT
“You can access the [facility] Patient Portal, offered by [facility], by registering with the following website: [email or web address]”

## 2017-01-05 NOTE — DISCHARGE NOTE ADULT - MEDICATION SUMMARY - MEDICATIONS TO STOP TAKING
I will STOP taking the medications listed below when I get home from the hospital:    lisinopril 20 mg oral tablet  -- 1 tab(s) by mouth once a day

## 2017-01-05 NOTE — DISCHARGE NOTE ADULT - HOSPITAL COURSE
71 year old female htn, hyperlipidemia, DM, history of electrolyte disturbance came to [place] from [place] few days ago. She was feeling poorly but decided to go to the mall today. In the mall she felt woozy and fell but her son caught her and brought her to the hospital where her sodium was found to be 116.  She was given normal saline now feels slightly better.  Patient was admitted and sodium rapidly corrected with normal saline and lasix. Her lisinopril was discontinued and nifedipine was added. She was advised to limit her fluid intake and follow up with outpatient physicians.  I changed her to januvia to daily for convenience and I advidr her to monitor blood pressure at home.  She should take salt tablets daily for one month then stop. Watch for ankle edema.

## 2017-01-05 NOTE — DISCHARGE NOTE ADULT - MEDICATION SUMMARY - MEDICATIONS TO TAKE
I will START or STAY ON the medications listed below when I get home from the hospital:    sodium chloride  -- 1000 milligram(s) by mouth once a day  -- Indication: For Hyponatremia    Aspirin Enteric Coated  -- 81 milligram(s) by mouth once a day  -- pt family member unaware of dose & freq  -- Indication: For Essential hypertension    Januvia 100 mg oral tablet  -- 1 tab(s) by mouth once a day  -- Indication: For Type 2 diabetes mellitus with other neurologic complication    metFORMIN 500 mg oral tablet  -- 1 tab(s) by mouth 2 times a day  -- Indication: For Type 2 diabetes mellitus with other neurologic complication    NIFEdipine 30 mg oral tablet, extended release  -- 1 tab(s) by mouth once a day  -- Indication: For Essential hypertension    ferrous sulfate  --  by mouth   -- pt family member unaware of dose & freq  -- Indication: For Anemia    Fish Oil  --  by mouth   -- pt family member unaware of dose & freq  -- Indication: For Supplement    Protonix  --  by mouth   -- pt family member unaware of dose & freq  -- Indication: For GERD    Vitamin C  --  by mouth   -- Indication: For Supplement

## 2018-06-29 NOTE — ED PROVIDER NOTE - CHIEF COMPLAINT
----- Message from Radha Benítez sent at 6/29/2018  9:17 AM PDT -----  Regarding: Patient wants to get prescription for Chantix  PAULO/Shira    Patient said that he has decided that he now wants to try Chantix and wants to get a prescription for it. He can be reached at [phone].   The patient is a 71y Female complaining of altered mental status.

## 2019-03-19 NOTE — H&P ADULT. - PROBLEM SELECTOR PLAN 4
lmvm ; needs to schedule lab; and be given lab results   discontinue metformin for now  Resume upon discharge

## 2022-10-20 NOTE — ED ADULT NURSE NOTE - GENITOURINARY WDL
Attending Attestation (For Attendings USE Only)... Bladder non-tender and non-distended. Urine clear yellow.
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
## 2024-04-28 NOTE — ED ADULT NURSE NOTE - TEMPLATE
Discontinue drug use.  Follow-up with PCP within the next few days in order to be re-evaluated.  Return to the ED with any new or worsening symptoms.   
Neuro